# Patient Record
Sex: MALE | Race: WHITE | NOT HISPANIC OR LATINO | Employment: UNEMPLOYED | URBAN - NONMETROPOLITAN AREA
[De-identification: names, ages, dates, MRNs, and addresses within clinical notes are randomized per-mention and may not be internally consistent; named-entity substitution may affect disease eponyms.]

---

## 2017-01-24 ENCOUNTER — TRANSCRIBE ORDERS (OUTPATIENT)
Dept: ADMINISTRATIVE | Facility: HOSPITAL | Age: 57
End: 2017-01-24

## 2017-01-24 DIAGNOSIS — R07.9 INTERMITTENT CHEST PAIN: Primary | ICD-10-CM

## 2017-01-24 DIAGNOSIS — I73.9 PVD (PERIPHERAL VASCULAR DISEASE) (HCC): ICD-10-CM

## 2017-02-03 ENCOUNTER — HOSPITAL ENCOUNTER (OUTPATIENT)
Dept: CARDIOLOGY | Facility: HOSPITAL | Age: 57
Discharge: HOME | End: 2017-02-03
Admitting: EMERGENCY MEDICINE

## 2017-02-03 VITALS
HEIGHT: 65 IN | BODY MASS INDEX: 32.99 KG/M2 | WEIGHT: 198 LBS | HEART RATE: 80 BPM | SYSTOLIC BLOOD PRESSURE: 117 MMHG | DIASTOLIC BLOOD PRESSURE: 64 MMHG

## 2017-02-03 DIAGNOSIS — R07.9 INTERMITTENT CHEST PAIN: ICD-10-CM

## 2017-02-03 DIAGNOSIS — I73.9 PVD (PERIPHERAL VASCULAR DISEASE) (HCC): ICD-10-CM

## 2017-02-03 PROCEDURE — 93017 CV STRESS TEST TRACING ONLY: CPT

## 2017-02-03 PROCEDURE — 93351 STRESS TTE COMPLETE: CPT

## 2017-02-03 PROCEDURE — 93018 CV STRESS TEST I&R ONLY: CPT | Performed by: INTERNAL MEDICINE

## 2017-02-03 PROCEDURE — 25010000003 DOBUTAMINE PER 250 MG: Performed by: INTERNAL MEDICINE

## 2017-02-03 PROCEDURE — 93350 STRESS TTE ONLY: CPT | Performed by: INTERNAL MEDICINE

## 2017-02-03 PROCEDURE — 25010000002 PERFLUTREN (DEFINITY) 8.476 MG IN SODIUM CHLORIDE 10 ML INJECTION: Performed by: INTERNAL MEDICINE

## 2017-02-03 PROCEDURE — 93352 ADMIN ECG CONTRAST AGENT: CPT | Performed by: INTERNAL MEDICINE

## 2017-02-03 RX ORDER — DOBUTAMINE HYDROCHLORIDE 100 MG/100ML
10-50 INJECTION INTRAVENOUS
Status: DISCONTINUED | OUTPATIENT
Start: 2017-02-03 | End: 2017-02-04 | Stop reason: HOSPADM

## 2017-02-03 RX ORDER — MAGNESIUM GLUCONATE 27 MG(500)
500 TABLET ORAL 2 TIMES DAILY
COMMUNITY
End: 2018-01-09

## 2017-02-03 RX ORDER — NORTRIPTYLINE HYDROCHLORIDE 50 MG/1
50 CAPSULE ORAL NIGHTLY
COMMUNITY

## 2017-02-03 RX ORDER — MELOXICAM 15 MG/1
15 TABLET ORAL DAILY
COMMUNITY
End: 2018-01-09

## 2017-02-03 RX ORDER — CILOSTAZOL 50 MG/1
50 TABLET ORAL 2 TIMES DAILY
COMMUNITY
End: 2018-01-09

## 2017-02-03 RX ORDER — LISINOPRIL 40 MG/1
40 TABLET ORAL DAILY
COMMUNITY

## 2017-02-03 RX ORDER — ASPIRIN 325 MG
325 TABLET, DELAYED RELEASE (ENTERIC COATED) ORAL EVERY 6 HOURS PRN
COMMUNITY
End: 2018-01-09

## 2017-02-03 RX ORDER — CALCIUM POLYCARBOPHIL 625 MG 625 MG/1
625 TABLET ORAL DAILY
COMMUNITY

## 2017-02-03 RX ORDER — WARFARIN SODIUM 5 MG/1
5 TABLET ORAL
COMMUNITY
End: 2018-01-09

## 2017-02-03 RX ORDER — FUROSEMIDE 40 MG/1
40 TABLET ORAL 2 TIMES DAILY
COMMUNITY

## 2017-02-03 RX ORDER — GABAPENTIN 800 MG/1
800 TABLET ORAL 3 TIMES DAILY
COMMUNITY
End: 2018-01-09 | Stop reason: DRUGHIGH

## 2017-02-03 RX ORDER — POTASSIUM CHLORIDE 750 MG/1
20 TABLET, FILM COATED, EXTENDED RELEASE ORAL 2 TIMES DAILY
COMMUNITY

## 2017-02-03 RX ORDER — AMLODIPINE BESYLATE 10 MG/1
10 TABLET ORAL DAILY
COMMUNITY

## 2017-02-03 RX ADMIN — ATROPINE SULFATE 0.3 MG: 0.1 INJECTION, SOLUTION INTRAVENOUS at 08:54

## 2017-02-03 RX ADMIN — DOBUTAMINE HYDROCHLORIDE 30 MCG/KG/MIN: 100 INJECTION INTRAVENOUS at 08:41

## 2017-02-03 RX ADMIN — SODIUM CHLORIDE 5 ML: 9 INJECTION INTRAMUSCULAR; INTRAVENOUS; SUBCUTANEOUS at 08:52

## 2017-02-03 RX ADMIN — SODIUM CHLORIDE 5 ML: 9 INJECTION INTRAMUSCULAR; INTRAVENOUS; SUBCUTANEOUS at 08:36

## 2017-02-06 LAB
BH CV STRESS BP STAGE 1: NORMAL
BH CV STRESS BP STAGE 2: NORMAL
BH CV STRESS BP STAGE 3: NORMAL
BH CV STRESS DOSE DOBUTAMINE STAGE 1: 10
BH CV STRESS DOSE DOBUTAMINE STAGE 2: 20
BH CV STRESS DOSE DOBUTAMINE STAGE 3: 30
BH CV STRESS DURATION MIN STAGE 1: 3
BH CV STRESS DURATION MIN STAGE 2: 3
BH CV STRESS DURATION MIN STAGE 3: 3
BH CV STRESS DURATION SEC STAGE 1: 0
BH CV STRESS DURATION SEC STAGE 2: 0
BH CV STRESS DURATION SEC STAGE 3: 31
BH CV STRESS HR STAGE 1: 73
BH CV STRESS HR STAGE 2: 93
BH CV STRESS HR STAGE 3: 140
BH CV STRESS PROTOCOL 1: NORMAL
BH CV STRESS RECOVERY BP: NORMAL MMHG
BH CV STRESS RECOVERY HR: 81 BPM
BH CV STRESS STAGE 1: 1
BH CV STRESS STAGE 2: 2
BH CV STRESS STAGE 3: 3
MAXIMAL PREDICTED HEART RATE: 164 BPM
PERCENT MAX PREDICTED HR: 85.37 %
STRESS BASELINE BP: NORMAL MMHG
STRESS BASELINE HR: 80 BPM
STRESS PERCENT HR: 100 %
STRESS POST EXERCISE DUR MIN: 0 MIN
STRESS POST EXERCISE DUR SEC: 10 SEC
STRESS POST PEAK BP: NORMAL MMHG
STRESS POST PEAK HR: 140 BPM
STRESS TARGET HR: 139 BPM

## 2017-02-21 ENCOUNTER — OFFICE VISIT (OUTPATIENT)
Dept: NEUROSURGERY | Facility: CLINIC | Age: 57
End: 2017-02-21

## 2017-02-21 VITALS
HEIGHT: 65 IN | DIASTOLIC BLOOD PRESSURE: 78 MMHG | WEIGHT: 195 LBS | BODY MASS INDEX: 32.49 KG/M2 | SYSTOLIC BLOOD PRESSURE: 128 MMHG

## 2017-02-21 DIAGNOSIS — Z78.9 NON-SMOKER: ICD-10-CM

## 2017-02-21 DIAGNOSIS — E66.3 OVER WEIGHT: ICD-10-CM

## 2017-02-21 DIAGNOSIS — M51.37 DEGENERATION OF LUMBAR OR LUMBOSACRAL INTERVERTEBRAL DISC: Primary | ICD-10-CM

## 2017-02-21 DIAGNOSIS — M48.061 SPINAL STENOSIS, LUMBAR REGION, WITHOUT NEUROGENIC CLAUDICATION: ICD-10-CM

## 2017-02-21 PROCEDURE — 99203 OFFICE O/P NEW LOW 30 MIN: CPT | Performed by: NURSE PRACTITIONER

## 2017-02-21 RX ORDER — CLINDAMYCIN HYDROCHLORIDE 300 MG/1
300 CAPSULE ORAL 3 TIMES DAILY
COMMUNITY
End: 2018-01-09

## 2017-02-21 NOTE — PROGRESS NOTES
Chief complaint:   Chief Complaint   Patient presents with   • Back Pain     Patient is having low back pain with pain going down into both legs with numbness down into the feet.  No physical therapy or pain management.        Subjective     HPI: This is a 57-year-old male gentleman who is referred to us for low back pain and lower extremity pain.  He is here to be evaluated today.  He states that the pain in his back began about 4 years ago.  He says it has progressively gotten worse since then.  Says the pain in his back is intermittent.  He says seeing get complete relief of his pain while he sitting or lying down however whenever he walks or stands for any length of time the pain will be very severe in his back causing him to have to sit down.  He says the pain will radiate down into his lower extremities bilaterally with the left leg being worse than the right.  He says this pain again is intermittent and is make completely better by sitting or lying down but it again is made worse by standing or walking.  He has no bowel or bladder problem's.  Denies any physical therapy, chiropractic care, or pain management injections.  He denies any fever recent or past infections.  He currently is incarcerated at this time.    Review of Systems   Eyes:        Wears glasses   Musculoskeletal: Positive for back pain and joint swelling.        Past Medical History   Diagnosis Date   • Arthritis    • Hypertension      Past Surgical History   Procedure Laterality Date   • Finger surgery     • Ankle surgery Right      Family History   Problem Relation Age of Onset   • Arthritis Mother    • Hypertension Mother    • Arthritis Father    • Hypertension Father    • No Known Problems Sister    • No Known Problems Sister      Social History   Substance Use Topics   • Smoking status: Never Smoker   • Smokeless tobacco: None   • Alcohol use No       (Not in a hospital admission)  Allergies:  Review of patient's allergies indicates no  "known allergies.    Objective      Vital Signs  Visit Vitals   • /78 (BP Location: Right arm, Patient Position: Sitting)   • Ht 65\" (165.1 cm)   • Wt 195 lb (88.5 kg)   • BMI 32.45 kg/m2       Physical Exam    Results Review: MRI lumbar spine that was done on 01/30/2017 at [facility] shows stenosis at L4-5 and 5-S1.  Stenosis does appear to be more prominent on the right foramen than the left.  There is Modic endplate changes associated at L4-5.  There is no cord signal changes that I can appreciate.  No fracture.  There is mention of discitis however I do not appreciate this.  There is central and foraminal stenosis noted at these levels.    L5-S1        L4-5          Assessment/Plan: At this point the patient does have lumbar stenosis along with lumbar disc degeneration.  I am not convinced at all that the patient does have discitis.  I am going to get in touch with the medical personnel at the alf to discuss this further with them.  I am is start the patient into a dedicated course of physical therapy consisting of 2-3 times a week for 4-6 weeks.  We will follow-up with him in about 6-8 weeks which time he will see Dr. Self.  The pinning on how he does from the therapy will depend what we do in the future as far as his treatment is concerned.  BMI shows that is overweight.  BMI chart was given the patient.  He is a nonsmoker.    I discussed the patients findings and my recommendations with patient    Octavio LU Munoz, APRN  02/21/17  11:54 AM          "

## 2017-03-07 ENCOUNTER — TRANSCRIBE ORDERS (OUTPATIENT)
Dept: PHYSICAL THERAPY | Facility: HOSPITAL | Age: 57
End: 2017-03-07

## 2017-03-07 DIAGNOSIS — M79.89 LEG SWELLING: ICD-10-CM

## 2017-03-07 DIAGNOSIS — M79.604 PAIN IN BOTH LOWER EXTREMITIES: ICD-10-CM

## 2017-03-07 DIAGNOSIS — M79.605 PAIN IN BOTH LOWER EXTREMITIES: ICD-10-CM

## 2017-03-07 DIAGNOSIS — M53.9 OTHER UNSPECIFIED BACK DISORDER: ICD-10-CM

## 2017-03-07 DIAGNOSIS — M54.5 LOW BACK PAIN, UNSPECIFIED BACK PAIN LATERALITY, UNSPECIFIED CHRONICITY, WITH SCIATICA PRESENCE UNSPECIFIED: Primary | ICD-10-CM

## 2017-03-08 ENCOUNTER — HOSPITAL ENCOUNTER (OUTPATIENT)
Dept: PHYSICAL THERAPY | Facility: HOSPITAL | Age: 57
Setting detail: THERAPIES SERIES
Discharge: HOME | End: 2017-03-08

## 2017-03-08 DIAGNOSIS — M53.9 OTHER UNSPECIFIED BACK DISORDER: Primary | ICD-10-CM

## 2017-03-08 PROCEDURE — 97161 PT EVAL LOW COMPLEX 20 MIN: CPT

## 2017-03-08 PROCEDURE — 97012 MECHANICAL TRACTION THERAPY: CPT

## 2017-03-08 NOTE — PROGRESS NOTES
Outpatient Physical Therapy Ortho Initial Evaluation  [facility]  Arron TAYLORJoanne Rodríguez, PT  17  11:48 AM       Patient Name: Raza Mckeon  : 1960  MRN: [id number]  Today's Date: 3/8/2017      Visit Date: 2017     Subjective Improvement: N/A      Attendance:   Approved: 18 Visits           MD follow up: PRN           RC date: 3/29/17           Patient Active Problem List   Diagnosis   • Degeneration of lumbar or lumbosacral intervertebral disc   • Spinal stenosis, lumbar region, without neurogenic claudication   • Over weight   • Non-smoker        Past Medical History   Diagnosis Date   • Arthritis    • Hypertension         Past Surgical History   Procedure Laterality Date   • Finger surgery     • Ankle surgery Right        Current Outpatient Prescriptions:   •  amLODIPine (NORVASC) 10 MG tablet, Take 10 mg by mouth Daily., Disp: , Rfl:   •  aspirin  MG tablet, Take 325 mg by mouth Every 6 (Six) Hours As Needed., Disp: , Rfl:   •  cilostazol (PLETAL) 50 MG tablet, Take 50 mg by mouth 2 (Two) Times a Day., Disp: , Rfl:   •  clindamycin (CLEOCIN) 300 MG capsule, Take 300 mg by mouth 3 (Three) Times a Day., Disp: , Rfl:   •  furosemide (LASIX) 40 MG tablet, Take 40 mg by mouth 2 (Two) Times a Day., Disp: , Rfl:   •  gabapentin (NEURONTIN) 800 MG tablet, Take 800 mg by mouth 3 (Three) Times a Day., Disp: , Rfl:   •  lisinopril (PRINIVIL,ZESTRIL) 40 MG tablet, Take 40 mg by mouth Daily., Disp: , Rfl:   •  magnesium gluconate (MAGONATE) 500 MG tablet, Take 500 mg by mouth 2 (Two) Times a Day., Disp: , Rfl:   •  meloxicam (MOBIC) 15 MG tablet, Take 15 mg by mouth Daily., Disp: , Rfl:   •  metoprolol tartrate (LOPRESSOR) 25 MG tablet, Take 25 mg by mouth 2 (Two) Times a Day., Disp: , Rfl:   •  nortriptyline (PAMELOR) 50 MG capsule, Take 50 mg by mouth Every Night., Disp: , Rfl:   •  polycarbophil (FIBERCON) 625 MG tablet, Take 625 mg by mouth Daily., Disp: , Rfl:   •  potassium  chloride (K-DUR) 10 MEQ CR tablet, Take 20 mEq by mouth 2 (Two) Times a Day., Disp: , Rfl:   •  warfarin (COUMADIN) 5 MG tablet, Take 5 mg by mouth Daily., Disp: , Rfl:     No Known Allergies    Visit Dx:     ICD-10-CM ICD-9-CM   1. Other unspecified back disorder M53.9 724.9             Patient History       03/08/17 1100          History    Chief Complaint Difficulty Walking;Difficulty with daily activities;Pain  -MD      Type of Pain Back pain  -MD      Date Current Problem(s) Began --   Chronic pain  -MD      Brief Description of Current Complaint Has had back pain for about 4 years, about 25 years, tried to catch a tractor tire, was told he had a pinched nerve. Pain is not constant, about two years ago was pretty intense. Doesn't bother him like it used to, sitting is okay, mostly the back of the legs and muscles burn really bad. Reports that he cannot walk very far. Legs get tired as well, tighten up.   -MD      Previous treatment for THIS PROBLEM Other (comment)   No previous treatments.  -MD      Onset Date- PT 3/8/17  -MD      Patient/Caregiver Goals Relieve pain;Return to prior level of function  -MD      Current Tobacco Use Cannot use tobacco in assisted, would like to be able to.   -MD      Patient's Rating of General Health Poor  -MD      Occupation/sports/leisure activities [facility]  -MD      What clinical tests have you had for this problem? MRI  -MD      Results of Clinical Tests Stenosis, bulging disc, DDD  -MD      Pain     Pain Location Back  -MD      Pain at Present 0  -MD      Pain at Best 0  -MD      Pain at Worst 10  -MD      What Performance Factors Make the Current Problem(s) WORSE? Walking makes him have 10/10 pain, can only walk about 100 yards.   -MD      What Performance Factors Make the Current Problem(s) BETTER? Resting, sitting.   -MD      Tolerance Time- Standing Can only walk about 100 yards before pain is so intense he has to sit down.   -MD      Tolerance  Time- Sitting Gets relief with sitting activties.   -MD      Tolerance Time- Walking Can only walk about 100 yards before he has to sit down.   -MD      Is your sleep disturbed? No  -MD        User Key  (r) = Recorded By, (t) = Taken By, (c) = Cosigned By    Initials Name Provider Type    MD Arron Rodríguez, PT Physical Therapist                PT Ortho       03/08/17 1100    Subjective Comments    Subjective Comments Reports not having much pain today, just gets fatigued and tired with ambulation of distance.   -MD    Precautions and Contraindications    Precautions/Limitations no known precautions/limitations  -MD    Subjective Pain    Able to rate subjective pain? yes  -MD    Pre-Treatment Pain Level 0  -MD    Post-Treatment Pain Level 0  -MD    Posture/Observations    Posture- WNL Posture is WNL  -MD    Posture/Observations Comments Poor seated posture at this time.   -MD    Sensation    Sensation WNL? WNL  -MD    Additional Comments Numbness and tingling B toes.   -MD    Neural Tension Signs- Lower Quarter Clearing    Slump Bilateral:;Negative  -MD    SLR Bilateral:;Negative  -MD    Sensory Screen for Light Touch- Lower Quarter Clearing    L1 (inguinal area) Bilateral:;Intact  -MD    L2 (anterior mid thigh) Bilateral:;Intact  -MD    L3 (distal anterior thigh) Bilateral:;Intact  -MD    L4 (medial lower leg/foot) Bilateral:;Intact  -MD    L5 (lateral lower leg/great toe) Bilateral:;Intact  -MD    S1 (bottom of foot) Bilateral:;Intact  -MD    Myotomal Screen- Lower Quarter Clearing    Hip flexion (L2) Bilateral:;WNL  -MD    Knee extension (L3) Bilateral:;WNL  -MD    Ankle DF (L4) Bilateral:;WNL  -MD    Great toe extension (L5) Bilateral:;WNL  -MD    Ankle PF (S1) Bilateral:;WNL  -MD    Knee flexion (S2) Bilateral:;WNL  -MD    Lumbar ROM Screen- Lower Quarter Clearing    Lumbar Flexion --   FF to dx shin  -MD    Lumbar Extension --   ext 75%  -MD    Lumbar Lateral Flexion Normal  -MD    SI/Hip Screen- Lower  Quarter Clearing    ASIS compression Bilateral:;Negative  -MD    ASIS distraction Bilateral:;Negative  -MD    Lumbar/SI Special Tests    Slump Test (Neural Tension) Bilateral:;Negative  -MD    SLR (Neural Tension) Bilateral:;Negative  -MD    Hip Special Tests    CARRINGTON (hip vs SI pathology) Bilateral:;Negative  -MD    Hip scour test (labral vs hip pathology) Bilateral:;Negative  -MD    Leg Length Test    True Equal  -MD    Left Hip    Hip Flexion Gross Movement (5/5) normal  -MD    Hip ABduction Gross Movement (5/5) normal  -MD    Hip ADduction Gross Movement (5/5) normal  -MD    Right Hip    Hip Flexion Gross Movement (5/5) normal  -MD    Hip ABduction Gross Movement (5/5) normal  -MD    Hip ADduction Gross Movement (5/5) normal  -MD    Left Knee    Knee Extension Gross Movement (5/5) normal  -MD    Knee Flexion Gross Movement (5/5) normal  -MD    Right Knee    Knee Extension Gross Movement (5/5) normal  -MD    Knee Flexion Gross Movement (5/5) normal  -MD    Left Ankle/Foot    Ankle PF Gross Movement (5/5) normal  -MD    Ankle Dorsiflexion Gross Movement (5/5) normal  -MD    Right Ankle/Foot    Ankle PF Gross Movement (5/5) normal  -MD    Ankle Dorsiflexion Gross Movement (5/5) normal  -MD    Gait Assessment/Treatment    Gait, Comment Non ant gt  -MD      User Key  (r) = Recorded By, (t) = Taken By, (c) = Cosigned By    Initials Name Provider Type    MD Arron Rodríguez, PT Physical Therapist                            Therapy Education       03/08/17 1100          Therapy Education    Given HEP;Symptoms/condition management;Pain management;Posture/body mechanics  -MD      Program New  -MD      How Provided Verbal;Written  -MD      Provided to Patient  -MD      Level of Understanding Verbalized  -MD        User Key  (r) = Recorded By, (t) = Taken By, (c) = Cosigned By    Initials Name Provider Type    MD Arron Rodríguez, PT Physical Therapist                PT OP Goals       03/08/17 1100       PT Short Term Goals     STG Date to Achieve 03/29/17  -MD     STG 1 Tolerate 45 minute treatment session, no increase in pain.  -MD     STG 2 I with trans ab iso.   -MD     STG 3 No cues for supine to sit and sit to supine transfer.   -MD     STG 4 No cues for seated posture throughout a 45 minute treatment session.   -MD     Long Term Goals    LTG Date to Achieve 03/29/17  -MD     LTG 1 AROM FF to ankles.   -MD     LTG 2 I with HEP.   -MD     LTG 3 Able to walk 500 ft with no increase in pain.   -MD     LTG 4 Able to stand, performing ther ex with no increase in pain for 15 minutes.   -MD     LTG 5 70% improved or greater.   -MD     Time Calculation    PT Goal Re-Cert Due Date 03/29/17   Visits 1/1, MD PRN  -MD       User Key  (r) = Recorded By, (t) = Taken By, (c) = Cosigned By    Initials Name Provider Type    MD Arron Rodríguez, PT Physical Therapist                PT Assessment/Plan       03/08/17 1100       PT Assessment    Functional Limitations Impaired gait;Decreased safety during functional activities;Limitation in home management;Limitations in community activities;Limitations in functional capacity and performance;Performance in leisure activities;Performance in self-care ADL  -MD     Impairments Gait;Endurance;Balance;Range of motion;Posture;Pain;Muscle strength  -MD     Assessment Comments Signs and symptoms consistent with lumbar stenosis, increased pain and tightness at LEs with ambulation and standing time.   -MD     Rehab Potential Good  -MD     Patient/caregiver participated in establishment of treatment plan and goals Yes  -MD     Patient would benefit from skilled therapy intervention Yes  -MD     PT Plan    PT Frequency 2x/week;3x/week  -MD     Predicted Duration of Therapy Intervention (days/wks) 4-6 weeks  -MD     Planned CPT's? PT EVAL LOW COMPLEXITY: 77167;PT RE-EVAL: 66417;PT THER PROC EA 15 MIN: 62810;PT THER ACT EA 15 MIN: 93673;PT MANUAL THERAPY EA 15 MIN: 58706;PT NEUROMUSC RE-EDUCATION EA 15 MIN:  38990;PT GAIT TRAINING EA 15 MIN: 96854;PT AQUATIC THERAPY EA 15 MIN: 48119;PT SELF CARE/HOME MGMT/TRAIN EA 15: 06522;PT HOT OR COLD PACK TREAT MCARE;PT ELECTRICAL STIM UNATTEND: ;PT ULTRASOUND EA 15 MIN: 98713;PT TRACTION LUMBAR: 64202  -MD     Physical Therapy Interventions (Optional Details) home exercise program;joint mobilization;lumbar stabilization;manual therapy techniques;modalities;neuromuscular re-education;patient/family education;postural re-education;ROM (Range of Motion);strengthening;stretching  -MD     PT Plan Comments Lumbar Mechanical Traction, core strength, education, IFC/MHP, HEP.   -MD       User Key  (r) = Recorded By, (t) = Taken By, (c) = Cosigned By    Initials Name Provider Type    MD Arron Rodríguez, PT Physical Therapist                Modalities       03/08/17 1100          Traction 05196    Traction Type Lumbar  -MD      Rx Minutes 10 min  -MD      Weight 70  -MD      Hold 60  -MD      Relax 20  -MD        User Key  (r) = Recorded By, (t) = Taken By, (c) = Cosigned By    Initials Name Provider Type    MD Arron Rodríguez, PT Physical Therapist              Exercises       03/08/17 1100          Subjective Comments    Subjective Comments Reports not having much pain today, just gets fatigued and tired with ambulation of distance.   -MD      Subjective Pain    Able to rate subjective pain? yes  -MD      Pre-Treatment Pain Level 0  -MD      Post-Treatment Pain Level 0  -MD        User Key  (r) = Recorded By, (t) = Taken By, (c) = Cosigned By    Initials Name Provider Type    MD Arron Rodríguez, PT Physical Therapist           Manual Rx (last 36 hours)      Manual Treatments       03/08/17 1100          Manual Rx 1    Manual Rx 1 Location B hips  -MD      Manual Rx 1 Type Long Axis Distraction  -MD      Manual Rx 1 Duration 1 min   -MD        User Key  (r) = Recorded By, (t) = Taken By, (c) = Cosigned By    Initials Name Provider Type    MD Arron Rodríguez, PT Physical Therapist                             Time Calculation:   Start Time: 1103  Stop Time: 1140  Time Calculation (min): 37 min  Total Timed Code Minutes- PT: 15 minute(s)     Therapy Charges for Today     Code Description Service Date Service Provider Modifiers Qty    93346535191  PT EVAL LOW COMPLEXITY 1 3/8/2017 Arron Rodríguez, PT GP 1    84644128560 HC PT TRACTION LUMBAR 3/8/2017 Arron Rodríguez, PT GP 1                    Arron Rodríguez, PT  3/8/2017

## 2017-03-14 ENCOUNTER — HOSPITAL ENCOUNTER (OUTPATIENT)
Dept: PHYSICAL THERAPY | Facility: HOSPITAL | Age: 57
Setting detail: THERAPIES SERIES
End: 2017-03-14

## 2017-03-17 ENCOUNTER — HOSPITAL ENCOUNTER (OUTPATIENT)
Dept: PHYSICAL THERAPY | Facility: HOSPITAL | Age: 57
Setting detail: THERAPIES SERIES
Discharge: HOME | End: 2017-03-17

## 2017-03-17 DIAGNOSIS — M53.9 OTHER UNSPECIFIED BACK DISORDER: Primary | ICD-10-CM

## 2017-03-17 PROCEDURE — 97110 THERAPEUTIC EXERCISES: CPT

## 2017-03-17 PROCEDURE — 97012 MECHANICAL TRACTION THERAPY: CPT

## 2017-03-17 NOTE — PROGRESS NOTES
"    Outpatient Physical Therapy Ortho Treatment Note  [facility]  Arron Enriquez PTA  17  11:42 AM       Patient Name: Raza Mckeon  : 1960  MRN: [id number]  Today's Date: 3/17/2017      Visit Date: 2017     Subjective Improvement: \"same\"       Attendance:    Approved: 18          MD follow up: PRN            RC date: 3/29/17           Visit Dx:    ICD-10-CM ICD-9-CM   1. Other unspecified back disorder M53.9 724.9       Patient Active Problem List   Diagnosis   • Degeneration of lumbar or lumbosacral intervertebral disc   • Spinal stenosis, lumbar region, without neurogenic claudication   • Over weight   • Non-smoker        Past Medical History   Diagnosis Date   • Arthritis    • Hypertension         Past Surgical History   Procedure Laterality Date   • Finger surgery     • Ankle surgery Right              PT Ortho       17 1000    Subjective Comments    Subjective Comments Pt denies pain today, but does report numbess in L Leg to the knee.   -MB    Subjective Pain    Able to rate subjective pain? yes  -MB    Pre-Treatment Pain Level 0  -MB    Post-Treatment Pain Level 0  -MB      User Key  (r) = Recorded By, (t) = Taken By, (c) = Cosigned By    Initials Name Provider Type    GLADIS Enriquez PTA Physical Therapy Assistant                            PT Assessment/Plan       17 1000       PT Assessment    Functional Limitations Impaired gait;Decreased safety during functional activities;Limitation in home management;Limitations in community activities;Limitations in functional capacity and performance;Performance in leisure activities;Performance in self-care ADL  -MB     Impairments Gait;Endurance;Balance;Range of motion;Posture;Pain;Muscle strength  -MB     Assessment Comments Pt tolerates/performs all therex well, with only min cues needs for proper form. Pt has + repsonse to lumbar mechanical traciton this date.  -MB     Rehab Potential Good  -MB     " Patient/caregiver participated in establishment of treatment plan and goals Yes  -MB     Patient would benefit from skilled therapy intervention Yes  -MB     PT Plan    PT Frequency 2x/week;3x/week  -MB     Predicted Duration of Therapy Intervention (days/wks) 4-6 weeks  -MB     PT Plan Comments Continue Lumbar traction, core stability.   -MB       User Key  (r) = Recorded By, (t) = Taken By, (c) = Cosigned By    Initials Name Provider Type    GLADIS Enriquez PTA Physical Therapy Assistant                Modalities       03/17/17 1000          Traction 58209    Traction Type Lumbar  -MB      Rx Minutes 12  -MB      Duration Intermittent  -MB      Position Hook-lying  -MB      Weight 70  -MB      Hold 60  -MB      Relax 20  -MB        User Key  (r) = Recorded By, (t) = Taken By, (c) = Cosigned By    Initials Name Provider Type    GLADIS Enriquez PTA Physical Therapy Assistant                Exercises       03/17/17 1000          Subjective Comments    Subjective Comments Pt denies pain today, but does report numbess in L Leg to the knee.   -MB      Subjective Pain    Able to rate subjective pain? yes  -MB      Pre-Treatment Pain Level 0  -MB      Post-Treatment Pain Level 0  -MB      Aquatics    Aquatics performed? No  -MB      Exercise 1    Exercise Name 1 LTR  -MB      Sets 1 1  -MB      Reps 1 10  -MB      Time (Seconds) 1 10  -MB      Exercise 2    Exercise Name 2 Piriformis S  -MB      Sets 2 3  -MB      Time (Seconds) 2 30  -MB      Exercise 3    Exercise Name 3 SKTC S  -MB      Sets 3 3  -MB      Time (Seconds) 3 30  -MB      Exercise 4    Exercise Name 4 Hip add Squeeze  -MB      Sets 4 2  -MB      Reps 4 10  -MB      Exercise 5    Exercise Name 5 Hooklying Clamshells  -MB      Equipment 5 Theraband  -MB      Resistance 5 Green  -MB      Sets 5 2  -MB      Reps 5 10  -MB      Exercise 6    Exercise Name 6 Trans ab iso  -MB      Sets 6 2  -MB      Reps 6 10  -MB      Time (Seconds) 6 5  -MB       "Exercise 7    Exercise Name 7 Bridges  -MB      Sets 7 2  -MB      Reps 7 10  -MB      Exercise 8    Exercise Name 8 DKTC with Pball  -MB      Sets 8 2  -MB      Reps 8 10  -MB        User Key  (r) = Recorded By, (t) = Taken By, (c) = Cosigned By    Initials Name Provider Type    GLADIS Enriquez PTA Physical Therapy Assistant                               PT OP Goals       03/17/17 1000       PT Short Term Goals    STG Date to Achieve 03/29/17  -MB     STG 1 Tolerate 45 minute treatment session, no increase in pain.  -MB     STG 1 Progress Met  -MB     STG 2 I with trans ab iso.   -MB     STG 2 Progress Progressing  -MB     STG 3 No cues for supine to sit and sit to supine transfer.   -MB     STG 3 Progress Progressing  -MB     STG 4 No cues for seated posture throughout a 45 minute treatment session.   -MB     STG 4 Progress Progressing  -MB     Long Term Goals    LTG Date to Achieve 03/29/17  -MB     LTG 1 AROM FF to ankles.   -MB     LTG 1 Progress Progressing  -MB     LTG 2 I with HEP.   -MB     LTG 2 Progress Progressing  -MB     LTG 3 Able to walk 500 ft with no increase in pain.   -MB     LTG 3 Progress Progressing  -MB     LTG 4 Able to stand, performing ther ex with no increase in pain for 15 minutes.   -MB     LTG 4 Progress Progressing  -MB     LTG 5 70% improved or greater.   -MB     LTG 5 Progress Progressing  -MB     Time Calculation    PT Goal Re-Cert Due Date 03/29/17 2/2, MD BLACKN, Improvement \"same\"  -MB       User Key  (r) = Recorded By, (t) = Taken By, (c) = Cosigned By    Initials Name Provider Type    GLADIS Enriquez PTA Physical Therapy Assistant                Therapy Education       03/17/17 1000          Therapy Education    Given HEP;Symptoms/condition management;Pain management;Posture/body mechanics  -MB      Program Reinforced  -MB      How Provided Verbal;Written  -MB      Provided to Patient  -MB      Level of Understanding Verbalized  -MB        User Key  (r) = Recorded By, " (t) = Taken By, (c) = Cosigned By    Initials Name Provider Type    GLADIS Enriquez PTA Physical Therapy Assistant                Time Calculation:   Start Time: 1015  Stop Time: 1135  Time Calculation (min): 80 min  Total Timed Code Minutes- PT: 80 minute(s)    Therapy Charges for Today     Code Description Service Date Service Provider Modifiers Qty    71680681488  PT THER PROC EA 15 MIN 3/17/2017 Arron Enriquze PTA GP 4    [id number]  PT TRACTION LUMBAR 3/17/2017 Arron Enriquez PTA GP 1                    Arron Enriquez PTA  3/17/2017

## 2017-03-21 ENCOUNTER — HOSPITAL ENCOUNTER (OUTPATIENT)
Dept: PHYSICAL THERAPY | Facility: HOSPITAL | Age: 57
Setting detail: THERAPIES SERIES
Discharge: HOME | End: 2017-03-21

## 2017-03-21 DIAGNOSIS — M53.9 OTHER UNSPECIFIED BACK DISORDER: Primary | ICD-10-CM

## 2017-03-21 PROCEDURE — 97110 THERAPEUTIC EXERCISES: CPT

## 2017-03-21 PROCEDURE — 97012 MECHANICAL TRACTION THERAPY: CPT

## 2017-03-21 NOTE — PROGRESS NOTES
"    Outpatient Physical Therapy Ortho Treatment Note  [facility]  Arron Enriquez PTA  17  3:30 PM       Patient Name: Raza Mckeon  : 1960  MRN: [id number]  Today's Date: 3/21/2017      Visit Date: 2017     Subjective Improvement: 20%       Attendance: 3/3   Approved: 18            MD follow up: PRN          RC date:  3/29/17        Visit Dx:    ICD-10-CM ICD-9-CM   1. Other unspecified back disorder M53.9 724.9       Patient Active Problem List   Diagnosis   • Degeneration of lumbar or lumbosacral intervertebral disc   • Spinal stenosis, lumbar region, without neurogenic claudication   • Over weight   • Non-smoker        Past Medical History   Diagnosis Date   • Arthritis    • Hypertension         Past Surgical History   Procedure Laterality Date   • Finger surgery     • Ankle surgery Right              PT Ortho       17 1300    Subjective Comments    Subjective Comments Pt states that he maybe moving a little better, but still uses the w/c going to and from the [place].   -MB    Subjective Pain    Able to rate subjective pain? yes  -MB    Pre-Treatment Pain Level 0  -MB    Post-Treatment Pain Level 0  -MB    Subjective Pain Comment Denies pain, just having \"sleeping\" feeling in R LE.  -MB      User Key  (r) = Recorded By, (t) = Taken By, (c) = Cosigned By    Initials Name Provider Type    GLADIS Enriquez PTA Physical Therapy Assistant                            PT Assessment/Plan       17 1400       PT Assessment    Functional Limitations Impaired gait;Decreased safety during functional activities;Limitation in home management;Limitations in community activities;Limitations in functional capacity and performance;Performance in leisure activities;Performance in self-care ADL  -MB     Impairments Gait;Endurance;Balance;Range of motion;Posture;Pain;Muscle strength  -MB     Assessment Comments Pt continues to have good tolerance/response to lumbar mechanical " "traction, with decrease in \"Sleepy\" feeling in R LE. Good knowledge of current HEP.  -MB     Rehab Potential Good  -MB     Patient/caregiver participated in establishment of treatment plan and goals Yes  -MB     Patient would benefit from skilled therapy intervention Yes  -MB     PT Plan    PT Frequency 2x/week;3x/week  -MB     Predicted Duration of Therapy Intervention (days/wks) 4-6 weeks  -MB     PT Plan Comments Possible seated core stability next. Continue lumbar mechanical traction.  -MB       User Key  (r) = Recorded By, (t) = Taken By, (c) = Cosigned By    Initials Name Provider Type    GLADIS Enriquez PTA Physical Therapy Assistant                Modalities       03/21/17 1300          Traction 51813    Traction Type Lumbar  -MB      Rx Minutes 15  -MB      Duration Intermittent  -MB      Position Hook-lying  -MB      Weight 70  -MB      Hold 60  -MB      Relax 20  -MB        User Key  (r) = Recorded By, (t) = Taken By, (c) = Cosigned By    Initials Name Provider Type    GLADIS Enriquez PTA Physical Therapy Assistant                Exercises       03/21/17 1300          Subjective Comments    Subjective Comments Pt states that he maybe moving a little better, but still uses the w/c going [place] and [place].   -MB      Subjective Pain    Able to rate subjective pain? yes  -MB      Pre-Treatment Pain Level 0  -MB      Post-Treatment Pain Level 0  -MB      Subjective Pain Comment Denies pain, just having \"sleeping\" feeling in R LE.  -MB      Aquatics    Aquatics performed? No  -MB      Exercise 1    Exercise Name 1 LTR  -MB      Sets 1 1  -MB      Reps 1 10  -MB      Time (Seconds) 1 10  -MB      Exercise 2    Exercise Name 2 Piriformis S  -MB      Sets 2 3  -MB      Time (Seconds) 2 30  -MB      Exercise 3    Exercise Name 3 SKTC S  -MB      Sets 3 3  -MB      Time (Seconds) 3 30  -MB      Exercise 4    Exercise Name 4 Hip add Squeeze  -MB      Sets 4 2  -MB      Reps 4 10  -MB      Exercise " 5    Exercise Name 5 Hooklying Clamshells  -MB      Equipment 5 Theraband  -MB      Resistance 5 Green  -MB      Sets 5 2  -MB      Reps 5 10  -MB      Exercise 6    Exercise Name 6 Bridges  -MB      Sets 6 2  -MB      Reps 6 10  -MB        User Key  (r) = Recorded By, (t) = Taken By, (c) = Cosigned By    Initials Name Provider Type    GLADIS Enriquez PTA Physical Therapy Assistant                               PT OP Goals       03/21/17 1400       PT Short Term Goals    STG Date to Achieve 03/29/17  -MB     STG 1 Tolerate 45 minute treatment session, no increase in pain.  -MB     STG 1 Progress Met  -MB     STG 2 I with trans ab iso.   -MB     STG 2 Progress Progressing  -MB     STG 3 No cues for supine to sit and sit to supine transfer.   -MB     STG 3 Progress Progressing  -MB     STG 4 No cues for seated posture throughout a 45 minute treatment session.   -MB     STG 4 Progress Progressing  -MB     Long Term Goals    LTG Date to Achieve 03/29/17  -MB     LTG 1 AROM FF to ankles.   -MB     LTG 1 Progress Progressing  -MB     LTG 2 I with HEP.   -MB     LTG 2 Progress Progressing  -MB     LTG 3 Able to walk 500 ft with no increase in pain.   -MB     LTG 3 Progress Progressing  -MB     LTG 4 Able to stand, performing ther ex with no increase in pain for 15 minutes.   -MB     LTG 4 Progress Progressing  -MB     LTG 5 70% improved or greater.   -MB     LTG 5 Progress Progressing  -MB     Time Calculation    PT Goal Re-Cert Due Date 03/29/17   3/3, MD PRN, Improvement 20%  -MB       User Key  (r) = Recorded By, (t) = Taken By, (c) = Cosigned By    Initials Name Provider Type    GLADIS Enriquez PTA Physical Therapy Assistant                Therapy Education       03/21/17 1400          Therapy Education    Given HEP;Symptoms/condition management;Pain management;Posture/body mechanics  -MB      Program Reinforced  -MB      How Provided Verbal;Written  -MB      Provided to Patient  -MB      Level of  Understanding Verbalized  -MB        User Key  (r) = Recorded By, (t) = Taken By, (c) = Cosigned By    Initials Name Provider Type    GLADIS Enriquez PTA Physical Therapy Assistant                Time Calculation:   Start Time: 1341  Stop Time: 1440  Time Calculation (min): 59 min  Total Timed Code Minutes- PT: 59 minute(s)    Therapy Charges for Today     Code Description Service Date Service Provider Modifiers Qty    09025714707 HC PT THER PROC EA 15 MIN 3/21/2017 Arron Enriquez PTA GP 3    69773440604 HC PT TRACTION LUMBAR 3/21/2017 Arron Enriquez PTA GP 1                    Arron Enriquez PTA  3/21/2017

## 2017-03-24 ENCOUNTER — HOSPITAL ENCOUNTER (OUTPATIENT)
Dept: PHYSICAL THERAPY | Facility: HOSPITAL | Age: 57
Setting detail: THERAPIES SERIES
Discharge: HOME | End: 2017-03-24

## 2017-03-24 DIAGNOSIS — M53.9 OTHER UNSPECIFIED BACK DISORDER: Primary | ICD-10-CM

## 2017-03-24 PROCEDURE — 97110 THERAPEUTIC EXERCISES: CPT

## 2017-03-24 PROCEDURE — 97012 MECHANICAL TRACTION THERAPY: CPT

## 2017-03-24 NOTE — PROGRESS NOTES
"    Outpatient Physical Therapy Ortho Treatment Note  [facility]  Arron Enriquez PTA  17  12:03 PM       Patient Name: Raza Mckeon  : 1960  MRN: [id number]  Today's Date: 3/24/2017      Visit Date: 2017     Subjective Improvement: 20%      Attendance:   Approved:  18 visits         MD follow up: PRN           RC date: 3/29/17          Visit Dx:    ICD-10-CM ICD-9-CM   1. Other unspecified back disorder M53.9 724.9       Patient Active Problem List   Diagnosis   • Degeneration of lumbar or lumbosacral intervertebral disc   • Spinal stenosis, lumbar region, without neurogenic claudication   • Over weight   • Non-smoker        Past Medical History:   Diagnosis Date   • Arthritis    • Hypertension         Past Surgical History:   Procedure Laterality Date   • ANKLE SURGERY Right    • FINGER SURGERY               PT Ortho       17 1100    Subjective Comments    Subjective Comments Pt reports that he has been hurting a little bit more after last treatment. Pt reports numbness/tingling in R foot has been a little worse this week.  -MB    Subjective Pain    Able to rate subjective pain? yes  -MB    Pre-Treatment Pain Level 4  -MB    Post-Treatment Pain Level 4  -MB    Sensation    Additional Comments Numbness and tingling in R LE  -MB      17 1300    Subjective Comments    Subjective Comments Pt states that he maybe moving a little better, but still uses the w/c going [place] and [place].   -MB    Subjective Pain    Able to rate subjective pain? yes  -MB    Pre-Treatment Pain Level 0  -MB    Post-Treatment Pain Level 0  -MB    Subjective Pain Comment Denies pain, just having \"sleeping\" feeling in R LE.  -MB      User Key  (r) = Recorded By, (t) = Taken By, (c) = Cosigned By    Initials Name Provider Type    GLADIS Enriquez PTA Physical Therapy Assistant                            PT Assessment/Plan       17 1100       PT Assessment    Functional " Limitations Impaired gait;Decreased safety during functional activities;Limitation in home management;Limitations in community activities;Limitations in functional capacity and performance;Performance in leisure activities;Performance in self-care ADL  -MB     Impairments Gait;Endurance;Balance;Range of motion;Posture;Pain;Muscle strength  -MB     Assessment Comments Pt displays proper log roll with supine to sit transfer with no cues. Did not progress pt to seated PN exercises secondary to increase in c/o pain with previous treament. + response to traction once again.  -MB     Rehab Potential Good  -MB     Patient/caregiver participated in establishment of treatment plan and goals Yes  -MB     Patient would benefit from skilled therapy intervention Yes  -MB     PT Plan    PT Frequency 2x/week;3x/week  -MB     Predicted Duration of Therapy Intervention (days/wks) 4-6 weeks  -MB     PT Plan Comments Progress to seated PN if pt can tolerate.  -MB       User Key  (r) = Recorded By, (t) = Taken By, (c) = Cosigned By    Initials Name Provider Type    GLADIS Enriquez PTA Physical Therapy Assistant                Modalities       03/24/17 1100          Traction 26350    Traction Type Lumbar  -MB      Rx Minutes 15  -MB      Duration Intermittent  -MB      Position Hook-lying  -MB      Weight 70  -MB      Hold 60  -MB      Relax 20  -MB        User Key  (r) = Recorded By, (t) = Taken By, (c) = Cosigned By    Initials Name Provider Type    GLADIS Enriquez PTA Physical Therapy Assistant                Exercises       03/24/17 1100          Subjective Comments    Subjective Comments Pt reports that he has been hurting a little bit more after last treatment. Pt reports numbness/tingling in R foot has been a little worse this week.  -MB      Subjective Pain    Able to rate subjective pain? yes  -MB      Pre-Treatment Pain Level 4  -MB      Post-Treatment Pain Level 4  -MB      Aquatics    Aquatics performed? No  -MB       Exercise 1    Exercise Name 1 LTR  -MB      Sets 1 1  -MB      Reps 1 10  -MB      Time (Seconds) 1 10  -MB      Exercise 2    Exercise Name 2 Piriformis S  -MB      Sets 2 3  -MB      Time (Seconds) 2 30  -MB      Exercise 3    Exercise Name 3 SKTC S  -MB      Sets 3 3  -MB      Time (Seconds) 3 30  -MB      Exercise 4    Exercise Name 4 Hip add Squeeze  -MB      Sets 4 2  -MB      Reps 4 10  -MB      Exercise 5    Exercise Name 5 Hooklying Clamshells  -MB      Equipment 5 Theraband  -MB      Resistance 5 Green  -MB      Sets 5 2  -MB      Reps 5 10  -MB        User Key  (r) = Recorded By, (t) = Taken By, (c) = Cosigned By    Initials Name Provider Type    GLADIS Enriquez PTA Physical Therapy Assistant                               PT OP Goals       03/24/17 1100       PT Short Term Goals    STG Date to Achieve 03/29/17  -MB     STG 1 Tolerate 45 minute treatment session, no increase in pain.  -MB     STG 1 Progress Met  -MB     STG 2 I with trans ab iso.   -MB     STG 2 Progress Met  -MB     STG 3 No cues for supine to sit and sit to supine transfer.   -MB     STG 3 Progress Met  -MB     STG 4 No cues for seated posture throughout a 45 minute treatment session.   -MB     STG 4 Progress Progressing  -MB     Long Term Goals    LTG Date to Achieve 03/29/17  -MB     LTG 1 AROM FF to ankles.   -MB     LTG 1 Progress Progressing  -MB     LTG 2 I with HEP.   -MB     LTG 2 Progress Progressing  -MB     LTG 3 Able to walk 500 ft with no increase in pain.   -MB     LTG 3 Progress Progressing  -MB     LTG 4 Able to stand, performing ther ex with no increase in pain for 15 minutes.   -MB     LTG 4 Progress Progressing  -MB     LTG 5 70% improved or greater.   -MB     LTG 5 Progress Progressing  -MB     Time Calculation    PT Goal Re-Cert Due Date 03/29/17 4/4, MD PRN, Improvement 20%  -MB       User Key  (r) = Recorded By, (t) = Taken By, (c) = Cosigned By    Initials Name Provider Type    GLADIS Enriquez PTA  Physical Therapy Assistant                Therapy Education       03/24/17 1100          Therapy Education    Given HEP;Symptoms/condition management;Pain management;Posture/body mechanics  -MB      Program Reinforced  -MB      How Provided Verbal;Written  -MB      Provided to Patient  -MB      Level of Understanding Verbalized  -MB        User Key  (r) = Recorded By, (t) = Taken By, (c) = Cosigned By    Initials Name Provider Type    GLADIS Enriquez PTA Physical Therapy Assistant                Time Calculation:   Start Time: 1105  Stop Time: 1203  Time Calculation (min): 58 min  Total Timed Code Minutes- PT: 58 minute(s)    Therapy Charges for Today     Code Description Service Date Service Provider Modifiers Qty    70153142304 HC PT THER PROC EA 15 MIN 3/24/2017 Arron Enriquez PTA GP 3    [id number]  PT TRACTION LUMBAR 3/24/2017 rAron Enriquez PTA GP 1                    Arron Enriquez PTA  3/24/2017

## 2017-03-28 ENCOUNTER — HOSPITAL ENCOUNTER (OUTPATIENT)
Dept: PHYSICAL THERAPY | Facility: HOSPITAL | Age: 57
Setting detail: THERAPIES SERIES
Discharge: HOME | End: 2017-03-28

## 2017-03-28 DIAGNOSIS — M53.9 OTHER UNSPECIFIED BACK DISORDER: Primary | ICD-10-CM

## 2017-03-28 PROCEDURE — 97110 THERAPEUTIC EXERCISES: CPT

## 2017-03-28 PROCEDURE — 97012 MECHANICAL TRACTION THERAPY: CPT

## 2017-03-28 NOTE — PROGRESS NOTES
"    Outpatient Physical Therapy Ortho Treatment Note  [facility]  Arron Enriquez PTA  17  4:12 PM       Patient Name: Raza Mckeon  : 1960  MRN: [id number]  Today's Date: 3/28/2017      Visit Date: 2017     Subjective Improvement: 20%      Attendance:   Approved: 18 visits          MD follow up: PRN           RC date: 3/27/17          Visit Dx:    ICD-10-CM ICD-9-CM   1. Other unspecified back disorder M53.9 724.9       Patient Active Problem List   Diagnosis   • Degeneration of lumbar or lumbosacral intervertebral disc   • Spinal stenosis, lumbar region, without neurogenic claudication   • Over weight   • Non-smoker        Past Medical History:   Diagnosis Date   • Arthritis    • Hypertension         Past Surgical History:   Procedure Laterality Date   • ANKLE SURGERY Right    • FINGER SURGERY               PT Ortho       17 1500    Subjective Comments    Subjective Comments Pt states that his back is not hurting today, but his R foot is having numbness and shooting pains.  -MB    Subjective Pain    Able to rate subjective pain? yes  -MB    Pre-Treatment Pain Level 0  -MB    Post-Treatment Pain Level 0  -MB    Subjective Pain Comment R foot is numb + has \"shooting pain.\"  -MB      User Key  (r) = Recorded By, (t) = Taken By, (c) = Cosigned By    Initials Name Provider Type    GLADIS Enriquez PTA Physical Therapy Assistant                            PT Assessment/Plan       17 1500       PT Assessment    Functional Limitations Impaired gait;Decreased safety during functional activities;Limitation in home management;Limitations in community activities;Limitations in functional capacity and performance;Performance in leisure activities;Performance in self-care ADL  -MB     Impairments Gait;Endurance;Balance;Range of motion;Posture;Pain;Muscle strength  -MB     Assessment Comments Pt needs cues to achieve and maintain seated PN posture. Pt still reports " "numbness/\"shooting\" pain in R foot at this time. Pt reports no overall improvement thus far with physical therapy.   -MB     Rehab Potential Good  -MB     Patient/caregiver participated in establishment of treatment plan and goals Yes  -MB     Patient would benefit from skilled therapy intervention Yes  -MB     PT Plan    PT Frequency 2x/week;3x/week  -MB     Predicted Duration of Therapy Intervention (days/wks) 4-6 weeks  -MB     PT Plan Comments Recheck Friday with primary PT.  -MB       User Key  (r) = Recorded By, (t) = Taken By, (c) = Cosigned By    Initials Name Provider Type    GLADIS Enriquez PTA Physical Therapy Assistant                Modalities       03/28/17 1500          Traction 97291    Traction Type Lumbar  -MB      Rx Minutes 15  -MB      Position Hook-lying  -MB      Weight 70  -MB      Hold 60  -MB      Relax 20  -MB        User Key  (r) = Recorded By, (t) = Taken By, (c) = Cosigned By    Initials Name Provider Type    GLADIS Enriquez PTA Physical Therapy Assistant                Exercises       03/28/17 1500          Subjective Comments    Subjective Comments Pt states that his back is not hurting today, but his R foot is having numbness and shooting pains.  -MB      Subjective Pain    Able to rate subjective pain? yes  -MB      Pre-Treatment Pain Level 0  -MB      Post-Treatment Pain Level 0  -MB      Subjective Pain Comment R foot is numb + has \"shooting pain.\"  -MB      Aquatics    Aquatics performed? No  -MB      Exercise 1    Exercise Name 1 LTR  -MB      Sets 1 1  -MB      Reps 1 10  -MB      Time (Seconds) 1 10  -MB      Exercise 2    Exercise Name 2 Piriformis S  -MB      Sets 2 3  -MB      Time (Seconds) 2 30  -MB      Exercise 3    Exercise Name 3 SKTC S  -MB      Sets 3 3  -MB      Time (Seconds) 3 30  -MB      Exercise 4    Exercise Name 4 Hip add Squeeze + bridges  -MB      Sets 4 2  -MB      Reps 4 10  -MB      Exercise 5    Exercise Name 5 Hooklying Clamshells  -MB      " Equipment 5 Theraband  -MB      Resistance 5 Green  -MB      Sets 5 2  -MB      Reps 5 10  -MB      Exercise 6    Exercise Name 6 Seated PN Education  -MB      Time (Minutes) 6 2  -MB      Exercise 7    Exercise Name 7 Seated PN with TA + March/LAQs  -MB      Cueing 7 Tactile  -MB      Sets 7 1  -MB      Reps 7 15  -MB      Exercise 8    Exercise Name 8 Seated nerve glides R LE (slump)  -MB      Sets 8 1  -MB      Reps 8 20  -MB        User Key  (r) = Recorded By, (t) = Taken By, (c) = Cosigned By    Initials Name Provider Type    GLADIS Enriquez PTA Physical Therapy Assistant                               PT OP Goals       03/28/17 1500       PT Short Term Goals    STG Date to Achieve 03/29/17  -MB     STG 1 Tolerate 45 minute treatment session, no increase in pain.  -MB     STG 1 Progress Met  -MB     STG 2 I with trans ab iso.   -MB     STG 2 Progress Progressing  -MB     STG 3 No cues for supine to sit and sit to supine transfer.   -MB     STG 3 Progress Progressing  -MB     STG 4 No cues for seated posture throughout a 45 minute treatment session.   -MB     STG 4 Progress Progressing  -MB     Long Term Goals    LTG Date to Achieve 03/29/17  -MB     LTG 1 AROM FF to ankles.   -MB     LTG 1 Progress Progressing  -MB     LTG 2 I with HEP.   -MB     LTG 2 Progress Progressing  -MB     LTG 3 Able to walk 500 ft with no increase in pain.   -MB     LTG 3 Progress Progressing  -MB     LTG 4 Able to stand, performing ther ex with no increase in pain for 15 minutes.   -MB     LTG 4 Progress Progressing  -MB     LTG 5 70% improved or greater.   -MB     LTG 5 Progress Progressing  -MB     Time Calculation    PT Goal Re-Cert Due Date 03/29/17 5/5, MD PRN, Improvement 20%  -MB       User Key  (r) = Recorded By, (t) = Taken By, (c) = Cosigned By    Initials Name Provider Type    GLADIS Enriquez PTA Physical Therapy Assistant                Therapy Education       03/28/17 1500          Therapy Education    Given  HEP;Symptoms/condition management;Pain management;Posture/body mechanics  -MB      Program Reinforced  -MB      How Provided Verbal;Written  -MB      Provided to Patient  -MB      Level of Understanding Verbalized  -MB        User Key  (r) = Recorded By, (t) = Taken By, (c) = Cosigned By    Initials Name Provider Type    GLADIS Enriquez PTA Physical Therapy Assistant                Time Calculation:   Start Time: 1510  Stop Time: 1610  Time Calculation (min): 60 min  Total Timed Code Minutes- PT: 60 minute(s)    Therapy Charges for Today     Code Description Service Date Service Provider Modifiers Qty    93187151626  PT THER PROC EA 15 MIN 3/28/2017 Arron Enriquez PTA GP 3    13063409846 HC PT TRACTION LUMBAR 3/28/2017 Arron Enriquez PTA GP 1                    Arron Enriquez PTA  3/28/2017

## 2017-03-29 ENCOUNTER — APPOINTMENT (OUTPATIENT)
Dept: PHYSICAL THERAPY | Facility: HOSPITAL | Age: 57
End: 2017-03-29

## 2017-03-31 ENCOUNTER — HOSPITAL ENCOUNTER (OUTPATIENT)
Dept: PHYSICAL THERAPY | Facility: HOSPITAL | Age: 57
Setting detail: THERAPIES SERIES
Discharge: HOME | End: 2017-03-31

## 2017-03-31 DIAGNOSIS — M53.9 OTHER UNSPECIFIED BACK DISORDER: Primary | ICD-10-CM

## 2017-03-31 PROCEDURE — 97110 THERAPEUTIC EXERCISES: CPT

## 2017-04-04 ENCOUNTER — APPOINTMENT (OUTPATIENT)
Dept: PHYSICAL THERAPY | Facility: HOSPITAL | Age: 57
End: 2017-04-04

## 2017-04-06 ENCOUNTER — APPOINTMENT (OUTPATIENT)
Dept: PHYSICAL THERAPY | Facility: HOSPITAL | Age: 57
End: 2017-04-06

## 2017-05-01 ENCOUNTER — OFFICE VISIT (OUTPATIENT)
Dept: NEUROSURGERY | Facility: CLINIC | Age: 57
End: 2017-05-01

## 2017-05-01 VITALS
WEIGHT: 195 LBS | DIASTOLIC BLOOD PRESSURE: 92 MMHG | SYSTOLIC BLOOD PRESSURE: 158 MMHG | BODY MASS INDEX: 32.49 KG/M2 | HEIGHT: 65 IN

## 2017-05-01 DIAGNOSIS — Z78.9 NON-SMOKER: ICD-10-CM

## 2017-05-01 DIAGNOSIS — M48.061 SPINAL STENOSIS, LUMBAR REGION, WITHOUT NEUROGENIC CLAUDICATION: ICD-10-CM

## 2017-05-01 DIAGNOSIS — E66.3 OVER WEIGHT: ICD-10-CM

## 2017-05-01 DIAGNOSIS — M51.37 DEGENERATION OF LUMBAR OR LUMBOSACRAL INTERVERTEBRAL DISC: Primary | ICD-10-CM

## 2017-05-01 PROCEDURE — 99213 OFFICE O/P EST LOW 20 MIN: CPT | Performed by: NURSE PRACTITIONER

## 2017-08-15 ENCOUNTER — DOCUMENTATION (OUTPATIENT)
Dept: PHYSICAL THERAPY | Facility: HOSPITAL | Age: 57
End: 2017-08-15

## 2017-08-15 DIAGNOSIS — M53.9 OTHER UNSPECIFIED BACK DISORDER: Primary | ICD-10-CM

## 2017-08-15 NOTE — THERAPY DISCHARGE NOTE
Outpatient Physical Therapy Discharge Summary         Patient Name: Raza Mckeon  : 1960  MRN: [id number]    Today's Date: 8/15/2017    Visit Dx:    ICD-10-CM ICD-9-CM   1. Other unspecified back disorder M53.9 724.9             PT OP Goals       08/15/17 1600       PT Short Term Goals    STG Date to Achieve 17  -MICHELE     STG 1 Tolerate 45 minute treatment session, no increase in pain.  -MICHELE     STG 1 Progress Met  -MICHELE     STG 2 I with trans ab iso.   -MICHELE     STG 2 Progress Not Met  -MICHELE     STG 3 No cues for supine to sit and sit to supine transfer.   -MICHELE     STG 3 Progress Not Met  -MICHELE     STG 4 No cues for seated posture throughout a 45 minute treatment session.   -MICHELE     STG 4 Progress Not Met  -MICHELE     Long Term Goals    LTG Date to Achieve 17  -MICHELE     LTG 1 AROM FF to ankles.   -MICHELE     LTG 1 Progress Met  -MICHELE     LTG 2 I with HEP.   -MICHELE     LTG 2 Progress Not Met  -MICHELE     LTG 3 Able to walk 500 ft with no increase in pain.   -MICHELE     LTG 3 Progress Not Met  -MICHELE     LTG 4 Able to stand, performing ther ex with no increase in pain for 15 minutes.   -MICHELE     LTG 4 Progress Not Met  -MICHELE     LTG 5 70% improved or greater.   -MICHELE     LTG 5 Progress Not Met  -MICHELE       User Key  (r) = Recorded By, (t) = Taken By, (c) = Cosigned By    Initials Name Provider Type    MICHELE Ludwig PTA Physical Therapy Assistant          OP PT Discharge Summary  Date of Discharge: 08/15/17  Reason for Discharge: Non-compliant (pt did not return after the 6th visit of therapy)  Outcomes Achieved: Patient able to partially acheive established goals  Discharge Destination: Other (comment) (pt had program established and is incarcerated at this time.)      Time Calculation:                    Nupur Ludwig PTA  8/15/2017

## 2018-01-09 ENCOUNTER — OFFICE VISIT (OUTPATIENT)
Dept: NEUROSURGERY | Facility: CLINIC | Age: 58
End: 2018-01-09

## 2018-01-09 VITALS
WEIGHT: 197 LBS | BODY MASS INDEX: 32.82 KG/M2 | DIASTOLIC BLOOD PRESSURE: 82 MMHG | HEIGHT: 65 IN | SYSTOLIC BLOOD PRESSURE: 140 MMHG

## 2018-01-09 DIAGNOSIS — M51.37 DEGENERATION OF LUMBAR OR LUMBOSACRAL INTERVERTEBRAL DISC: ICD-10-CM

## 2018-01-09 DIAGNOSIS — M48.061 SPINAL STENOSIS, LUMBAR REGION, WITHOUT NEUROGENIC CLAUDICATION: Primary | ICD-10-CM

## 2018-01-09 DIAGNOSIS — G60.9 HEREDITARY AND IDIOPATHIC PERIPHERAL NEUROPATHY: ICD-10-CM

## 2018-01-09 DIAGNOSIS — Z78.9 NON-SMOKER: ICD-10-CM

## 2018-01-09 PROBLEM — E66.3 OVER WEIGHT: Status: RESOLVED | Noted: 2017-02-21 | Resolved: 2018-01-09

## 2018-01-09 PROCEDURE — 99213 OFFICE O/P EST LOW 20 MIN: CPT | Performed by: NEUROLOGICAL SURGERY

## 2018-01-09 RX ORDER — GABAPENTIN 600 MG/1
600 TABLET ORAL 3 TIMES DAILY
COMMUNITY

## 2018-01-09 NOTE — PROGRESS NOTES
SUBJECTIVE:  Patient ID: Raza Mckeon is a 58 y.o. male is here today for follow-up.    Chief Complaint:leg apin  Chief Complaint   Patient presents with   • back & leg pain     patient went to pain mgmt, had injections x 2 without any relief of his pain; he is here to see if there is a surgical option to help his symptoms.       HPI  58-year-old gentleman that we have been following on and off for back pain and leg pain for about a year.  He did a dedicated course of physical therapy for about 4 sessions without any improvement.  He has had 3 injections from Dr. Martinez without any improvement.  He primarily complains of stocking distribution of pain and burning in his feet and calves right greater than left.  It does seem to be worse with activity and worse at night.  He has daily back pain but that is not his biggest issue he says.  He has been on various doses of Neurontin and is on also on Elavil.  He did come off the Neurontin 1. and he says he noticed a huge difference in his pain getting worse in his legs    The following portions of the patient's history were reviewed and updated as appropriate: allergies, current medications, past family history, past medical history, past social history, past surgical history and problem list.    OBJECTIVE:    Review of Systems   Musculoskeletal: Positive for back pain.   Neurological: Positive for numbness.   All other systems reviewed and are negative.         Physical Exam   Constitutional: He is oriented to person, place, and time. He appears well-developed and well-nourished.   HENT:   Head: Normocephalic and atraumatic.   Right Ear: Hearing normal.   Left Ear: Hearing normal.   Eyes: EOM are normal. Pupils are equal, round, and reactive to light.   Neck: Normal range of motion.   Neurological: He is alert and oriented to person, place, and time. He has normal strength and normal reflexes. No cranial nerve deficit or sensory deficit. He displays a negative Romberg  sign. GCS eye subscore is 4. GCS verbal subscore is 5. GCS motor subscore is 6. He displays no Babinski's sign on the right side. He displays no Babinski's sign on the left side.   Psychiatric: His speech is normal. Judgment normal. Cognition and memory are normal.       Neurologic Exam     Mental Status   Oriented to person, place, and time.   Speech: speech is normal     Cranial Nerves     CN III, IV, VI   Pupils are equal, round, and reactive to light.  Extraocular motions are normal.     Motor Exam     Strength   Strength 5/5 throughout.       Independent Review of Radiographic Studies:   MRI the lumbar spine from a year ago shows degenerative disc disease at L5-S1 and L4-5 with some relative lumbar stenosis at those levels    ASSESSMENT/PLAN:  Mr Mckeon is very clear that his stocking distribution leg pain is his biggest complaint.  This is clinically consistent with peripheral neuropathy.  I do not think he requires back surgery at this point.  We are going to increase his Neurontin to 900 mg twice a day and his Elavil to 75 mg twice a day to see if this improves the effect of these medicines.  In Addition  I would recommend diabetic shoes to make his walking more comfortable while dealing with a neuropathy.      1. Spinal stenosis, lumbar region, without neurogenic claudication    2. Degeneration of lumbar or lumbosacral intervertebral disc    3. BMI 32.0-32.9,adult    4. Non-smoker            No Follow-up on file.      Billy Self MD

## 2018-01-09 NOTE — PATIENT INSTRUCTIONS
